# Patient Record
Sex: MALE | NOT HISPANIC OR LATINO | Employment: FULL TIME | URBAN - NONMETROPOLITAN AREA
[De-identification: names, ages, dates, MRNs, and addresses within clinical notes are randomized per-mention and may not be internally consistent; named-entity substitution may affect disease eponyms.]

---

## 2024-07-24 ENCOUNTER — HOSPITAL ENCOUNTER (EMERGENCY)
Facility: HOSPITAL | Age: 29
Discharge: HOME | End: 2024-07-24
Attending: FAMILY MEDICINE

## 2024-07-24 VITALS
BODY MASS INDEX: 20.02 KG/M2 | HEIGHT: 67 IN | HEART RATE: 63 BPM | WEIGHT: 127.54 LBS | OXYGEN SATURATION: 98 % | SYSTOLIC BLOOD PRESSURE: 121 MMHG | TEMPERATURE: 97.3 F | DIASTOLIC BLOOD PRESSURE: 73 MMHG | RESPIRATION RATE: 20 BRPM

## 2024-07-24 DIAGNOSIS — K08.89 PAIN, DENTAL: Primary | ICD-10-CM

## 2024-07-24 PROCEDURE — 99283 EMERGENCY DEPT VISIT LOW MDM: CPT

## 2024-07-24 PROCEDURE — 2500000001 HC RX 250 WO HCPCS SELF ADMINISTERED DRUGS (ALT 637 FOR MEDICARE OP)

## 2024-07-24 RX ORDER — LIDOCAINE HYDROCHLORIDE 20 MG/ML
1.25 SOLUTION OROPHARYNGEAL AS NEEDED
Qty: 100 ML | Refills: 0 | Status: SHIPPED | OUTPATIENT
Start: 2024-07-24 | End: 2024-08-03

## 2024-07-24 RX ORDER — OXYCODONE AND ACETAMINOPHEN 5; 325 MG/1; MG/1
TABLET ORAL
Status: COMPLETED
Start: 2024-07-24 | End: 2024-07-24

## 2024-07-24 RX ORDER — OXYCODONE AND ACETAMINOPHEN 5; 325 MG/1; MG/1
1 TABLET ORAL ONCE
Status: COMPLETED | OUTPATIENT
Start: 2024-07-24 | End: 2024-07-24

## 2024-07-24 ASSESSMENT — COLUMBIA-SUICIDE SEVERITY RATING SCALE - C-SSRS
1. IN THE PAST MONTH, HAVE YOU WISHED YOU WERE DEAD OR WISHED YOU COULD GO TO SLEEP AND NOT WAKE UP?: NO
6. HAVE YOU EVER DONE ANYTHING, STARTED TO DO ANYTHING, OR PREPARED TO DO ANYTHING TO END YOUR LIFE?: NO
2. HAVE YOU ACTUALLY HAD ANY THOUGHTS OF KILLING YOURSELF?: NO

## 2024-07-24 ASSESSMENT — PAIN DESCRIPTION - ORIENTATION: ORIENTATION: LEFT

## 2024-07-24 ASSESSMENT — PAIN DESCRIPTION - FREQUENCY: FREQUENCY: CONSTANT/CONTINUOUS

## 2024-07-24 ASSESSMENT — PAIN - FUNCTIONAL ASSESSMENT: PAIN_FUNCTIONAL_ASSESSMENT: 0-10

## 2024-07-24 ASSESSMENT — PAIN DESCRIPTION - PAIN TYPE: TYPE: ACUTE PAIN

## 2024-07-24 ASSESSMENT — PAIN SCALES - GENERAL
PAINLEVEL_OUTOF10: 5 - MODERATE PAIN
PAINLEVEL_OUTOF10: 6

## 2024-07-24 NOTE — ED PROVIDER NOTES
HPI   Chief Complaint   Patient presents with    Dental Pain     Pt states he was suppose to have his wisdom teeth out two months ago but missed his appointment and he has rescheduled but it is not for 3 months, pt states that his mouth started having sharp throbbing pain and this started a couple of hours ago, he states he took 1000mg of Ibuprofen about 45 minutes ago and it has just now started to kick in, he rates his pain 4-5 at this time, prior it was a 9.        28-year-old male comes the ED with complaint of dental pain that has been dealing with for the last several months.  Patient reports that he is set to have his wisdom teeth taken out but missed his appointment and had to reschedule.  Patient reports tonight his teeth began to be more painful and throbbing and was having a hard time sleeping.  Patient reports he took some Motrin and came to the ED for evaluation.  Patient in the ED is alert, cooperative, appears anxious, uncomfortable, but in no distress.  Patient reports since arrival to ED his pain is steadily improving since taking his Motrin earlier.  Patient states he was unsure what to do because of the pain which prompted his visit to the ED.  Patient reports no other associate symptoms or complaints.      History provided by:  Patient and medical records   used: No            Patient History   Past Medical History:   Diagnosis Date    Personal history of other mental and behavioral disorders     History of depression     History reviewed. No pertinent surgical history.  No family history on file.  Social History     Tobacco Use    Smoking status: Never    Smokeless tobacco: Never   Substance Use Topics    Alcohol use: Never    Drug use: Never       Physical Exam   ED Triage Vitals [07/24/24 0008]   Temperature Heart Rate Respirations BP   36.3 °C (97.3 °F) 63 20 121/73      Pulse Ox Temp Source Heart Rate Source Patient Position   98 % Oral Monitor --      BP Location FiO2  (%)     Right arm --       Physical Exam  Vitals and nursing note reviewed.   Constitutional:       General: He is not in acute distress.     Appearance: He is well-developed.   HENT:      Head: Normocephalic and atraumatic.      Mouth/Throat:      Dentition: Dental tenderness and dental caries present. No gingival swelling, dental abscesses or gum lesions.        Comments: Small area of tenderness along the rear molars and wisdom teeth on the upper row no signs of infection or swelling or redness  Eyes:      Conjunctiva/sclera: Conjunctivae normal.   Cardiovascular:      Rate and Rhythm: Normal rate and regular rhythm.      Heart sounds: No murmur heard.  Pulmonary:      Effort: Pulmonary effort is normal. No respiratory distress.      Breath sounds: Normal breath sounds.   Abdominal:      Palpations: Abdomen is soft.      Tenderness: There is no abdominal tenderness.   Musculoskeletal:         General: No swelling.      Cervical back: Neck supple.   Skin:     General: Skin is warm and dry.      Capillary Refill: Capillary refill takes less than 2 seconds.   Neurological:      Mental Status: He is alert.   Psychiatric:         Mood and Affect: Mood normal.           ED Course & MDM   Diagnoses as of 07/24/24 0049   Pain, dental                       [place] Coma Scale Score: 15                        Medical Decision Making  Patient upon arrival to the ED appeared to be anxious and uncomfortable but in no distress with stable vital signs.  Discussed with patient's presenting complaints clinical findings.  Reviewed patient's epic chart and counseled patient on dental complaints and appropriate approach to management/treatments.  After assessment and evaluation findings on his physical exam appear to be consistent with his recurrent dental pain along the rear molar and wisdom teeth that he has planned to have removed.  No sign of infection was noted and patient is given oral dose of Percocet in the ED to aid with  control of his pain.  Patient also educated on use of over-the-counter medication for management of his pain and he also given prescription for viscous lidocaine to aid with control.  Patient encouraged to contact his dentist in the morning to reschedule follow-up to further assess his dental pain for definitive care.  Patient stable and discharged home.    Amount and/or Complexity of Data Reviewed  External Data Reviewed: labs, radiology and notes.    Risk  Prescription drug management.        Procedure  Procedures     Zhang Quick MD  07/24/24 0052

## 2024-07-24 NOTE — ED TRIAGE NOTES
Pt states he was suppose to have his wisdom teeth out two months ago but missed his appointment and he has rescheduled but it is not for 3 months, pt states that his mouth started having sharp throbbing pain and this started a couple of hours ago, he states he took 1000mg of Ibuprofen about 45 minutes ago and it has just now started to kick in, he rates his pain 4-5 at this time, prior it was a 9.